# Patient Record
Sex: FEMALE | Race: WHITE | NOT HISPANIC OR LATINO | URBAN - METROPOLITAN AREA
[De-identification: names, ages, dates, MRNs, and addresses within clinical notes are randomized per-mention and may not be internally consistent; named-entity substitution may affect disease eponyms.]

---

## 2019-02-19 ENCOUNTER — OUTPATIENT (OUTPATIENT)
Dept: OUTPATIENT SERVICES | Facility: HOSPITAL | Age: 74
LOS: 1 days | Discharge: HOME | End: 2019-02-19

## 2019-02-19 DIAGNOSIS — Z00.00 ENCOUNTER FOR GENERAL ADULT MEDICAL EXAMINATION WITHOUT ABNORMAL FINDINGS: ICD-10-CM

## 2019-04-01 ENCOUNTER — APPOINTMENT (OUTPATIENT)
Dept: VASCULAR SURGERY | Facility: CLINIC | Age: 74
End: 2019-04-01
Payer: MEDICARE

## 2019-04-01 VITALS
HEIGHT: 62 IN | BODY MASS INDEX: 31.1 KG/M2 | DIASTOLIC BLOOD PRESSURE: 80 MMHG | SYSTOLIC BLOOD PRESSURE: 120 MMHG | WEIGHT: 169 LBS

## 2019-04-01 DIAGNOSIS — Z78.9 OTHER SPECIFIED HEALTH STATUS: ICD-10-CM

## 2019-04-01 DIAGNOSIS — Z86.39 PERSONAL HISTORY OF OTHER ENDOCRINE, NUTRITIONAL AND METABOLIC DISEASE: ICD-10-CM

## 2019-04-01 DIAGNOSIS — Z87.891 PERSONAL HISTORY OF NICOTINE DEPENDENCE: ICD-10-CM

## 2019-04-01 DIAGNOSIS — M79.602 PAIN IN LEFT ARM: ICD-10-CM

## 2019-04-01 DIAGNOSIS — Z86.79 PERSONAL HISTORY OF OTHER DISEASES OF THE CIRCULATORY SYSTEM: ICD-10-CM

## 2019-04-01 PROCEDURE — 93971 EXTREMITY STUDY: CPT

## 2019-04-01 PROCEDURE — 99203 OFFICE O/P NEW LOW 30 MIN: CPT

## 2019-04-01 RX ORDER — LEVOTHYROXINE SODIUM 137 UG/1
TABLET ORAL
Refills: 0 | Status: ACTIVE | COMMUNITY

## 2019-04-01 RX ORDER — METOPROLOL TARTRATE 75 MG/1
TABLET, FILM COATED ORAL
Refills: 0 | Status: ACTIVE | COMMUNITY

## 2019-04-01 RX ORDER — SIMVASTATIN 80 MG/1
TABLET, FILM COATED ORAL
Refills: 0 | Status: ACTIVE | COMMUNITY

## 2019-04-01 NOTE — DATA REVIEWED
[FreeTextEntry1] : venous duplex- Left upper extremity no evidence of DVT or superficial thrombophlebitis

## 2019-04-01 NOTE — ASSESSMENT
[FreeTextEntry1] : The patient is a 73-year-old female with complaints of left arm radiating pain while in the lying position. I performed a venous duplex there was no evidence of deep vein thrombosis or superficial thrombophlebitis. She has palpable pulses with no change in the pulse exam with provocative measure ruling out arterial thoracic outlet syndrome. I recommend the patient followup with neurology as her symptoms may be neurologic in nature. She may require imaging of her cervical spine. The presentation does not appear to be neurogenic thoracic outlet syndrome as well  as the symptoms are not produced on provocative maneuvers. She will f/u with me on as needed basis.

## 2019-04-01 NOTE — HISTORY OF PRESENT ILLNESS
[FreeTextEntry1] : HANNAH is a 73-year-old female who presents complaining of left arm radicular type pain. She informs me this pain occurs at night at rest while she's lying in bed. It is intermittent and not constant. Patient presents for evaluation and rule out DVT of her left upper extremity. No arm swelling and no pain most of the day and with use. No known neck trauma.

## 2019-04-01 NOTE — PHYSICAL EXAM
[2+] : left 2+ [Normal Breath Sounds] : Normal breath sounds [Normal Heart Sounds] : normal heart sounds [Alert] : alert [Oriented to Person] : oriented to person [Oriented to Place] : oriented to place [Abdomen Masses] : No abdominal masses [Abdomen Tenderness] : ~T ~M No abdominal tenderness [Abdominal Bruit] : no abdominal bruit  [de-identified] : no scars  [FreeTextEntry1] : no evidence of significant swelling. No change in the pulse exam or pain with provocative maneuvers.  [de-identified] : intact motor and sensory in the arms and legs